# Patient Record
Sex: FEMALE | Race: WHITE
[De-identification: names, ages, dates, MRNs, and addresses within clinical notes are randomized per-mention and may not be internally consistent; named-entity substitution may affect disease eponyms.]

---

## 2019-09-13 ENCOUNTER — RESULT REVIEW (OUTPATIENT)
Age: 64
End: 2019-09-13

## 2019-11-07 PROBLEM — Z00.00 ENCOUNTER FOR PREVENTIVE HEALTH EXAMINATION: Status: ACTIVE | Noted: 2019-11-07

## 2019-12-30 ENCOUNTER — APPOINTMENT (OUTPATIENT)
Dept: UROGYNECOLOGY | Facility: CLINIC | Age: 64
End: 2019-12-30
Payer: MEDICARE

## 2019-12-30 ENCOUNTER — OUTPATIENT (OUTPATIENT)
Dept: OUTPATIENT SERVICES | Facility: HOSPITAL | Age: 64
LOS: 1 days | Discharge: HOME | End: 2019-12-30

## 2019-12-30 VITALS
BODY MASS INDEX: 27.6 KG/M2 | HEIGHT: 62 IN | SYSTOLIC BLOOD PRESSURE: 132 MMHG | DIASTOLIC BLOOD PRESSURE: 82 MMHG | WEIGHT: 150 LBS | HEART RATE: 81 BPM

## 2019-12-30 DIAGNOSIS — Z86.39 PERSONAL HISTORY OF OTHER ENDOCRINE, NUTRITIONAL AND METABOLIC DISEASE: ICD-10-CM

## 2019-12-30 DIAGNOSIS — Z78.9 OTHER SPECIFIED HEALTH STATUS: ICD-10-CM

## 2019-12-30 DIAGNOSIS — Z86.79 PERSONAL HISTORY OF OTHER DISEASES OF THE CIRCULATORY SYSTEM: ICD-10-CM

## 2019-12-30 DIAGNOSIS — Z80.9 FAMILY HISTORY OF MALIGNANT NEOPLASM, UNSPECIFIED: ICD-10-CM

## 2019-12-30 DIAGNOSIS — Z83.3 FAMILY HISTORY OF DIABETES MELLITUS: ICD-10-CM

## 2019-12-30 PROCEDURE — 99205 OFFICE O/P NEW HI 60 MIN: CPT

## 2019-12-30 PROCEDURE — 51701 INSERT BLADDER CATHETER: CPT

## 2019-12-30 RX ORDER — LISINOPRIL 10 MG/1
10 TABLET ORAL
Refills: 0 | Status: ACTIVE | COMMUNITY

## 2019-12-30 RX ORDER — LEVOTHYROXINE AND LIOTHYRONINE 38; 9 UG/1; UG/1
60 TABLET ORAL
Refills: 0 | Status: ACTIVE | COMMUNITY

## 2019-12-30 RX ORDER — METOPROLOL TARTRATE 75 MG/1
TABLET, FILM COATED ORAL
Refills: 0 | Status: ACTIVE | COMMUNITY

## 2019-12-31 DIAGNOSIS — R35.0 FREQUENCY OF MICTURITION: ICD-10-CM

## 2020-01-01 LAB — BACTERIA UR CULT: NORMAL

## 2020-01-01 NOTE — HISTORY OF PRESENT ILLNESS
[FreeTextEntry1] : \par Pt with pelvic floor dysfunction here for urogynecologic evaluation. She describes: \par Referring provider: Dr Gupta\par PCP: Dr Darren Adhikari\par \par Chief PFD: urinary frequency since pessary insertion\par \par 4/26/19 renal ultrasound: mild fullness of the left renal collecting system, unchanged from 9/2017, and a 3.2 mm echogenic nodule within the left renal cortex\par 10/19 CT: echogenic nodule of the left kidney, does not mention fullness or hydro \par \par Pelvic organ prolapse: +bulge, had Gellhorn placed 2.5 months ago, no splinting prior to gellhorn placement\par Stress urinary incontinence: min\par Overactive bladder syndrome: urinary frequency and urgency, no incontinence, no eneuresis, since pessary placement, no prior treatment, no glaucoma\par Voiding dysfunction: denies incomplete bladder emptying, denies hesitancy \par Lower urinary tract/vaginal symptoms: no recurrent UTIs per year, no hematuria, no dysuria, no bladder pain \par Sexual dysfunction: not active since pessary inserted, no pain or urinary incontinence with intercourse\par Pelvic pain: denies\par Vaginal dryness: denies \par \par Her pelvic floor symptoms are significantly bothersome and negatively impacting her quality of life. \par \par

## 2020-01-01 NOTE — COUNSELING
[FreeTextEntry1] : \par We will notify you of the urine results if they are abnormal\par \par Please increase your water intake to at least 4 cups of plain water per day\par \par For 4-5 days, cut one item from the list out of your diet.\par \par After 4-5 days, it it makes a difference, you have to decide if it is worth keeping it out of your diet to help with the urinary issues.\par \par If it does not make a difference, you should add it back into your diet and remove another item for another 4-5 days.\par \par Schedule pessary fitting with my PA, Carey\par \par Happy New Years!

## 2020-01-01 NOTE — DISCUSSION/SUMMARY
[FreeTextEntry1] : \par Uterovaginal prolapse incomplete-\par The patient was counseled regarding the possible natural progression of prolapse and the clinical consequences of worsening prolapse. The stage and the location of the prolapse was reviewed with the patient. She was counseled regarding the management strategies including observation, pelvic floor physical therapy, pessary placement and surgery (robotic hysterectomy/BS0/sacrocolpopexy). The patient voiced understanding and agrees with a pessary fitting. She wants to be sexually active.\par \par Gellhorn long stem 3/4 inch removed, cleaned, inspected and reinserted. The patient tolerated this well. \par No bleeding, lesions, abnormal discharge were noted. \par \par Urinary frequency-\par The pathophysiology of the above condition was discussed with the patient. The patient was given information and education on pelvic floor muscle exercises/rehabilitation, avoidance of dietary bladder irritants, and other strategies to improve bladder control, such as scheduled voiding. She was counseled regarding further management strategies for overactive bladder and urge incontinence including pelvic floor physical therapy, medications or surgical management. The patient voiced understanding and agrees with diet changes. We will follow up on her urine tests.\par \par \par \par

## 2020-02-06 ENCOUNTER — APPOINTMENT (OUTPATIENT)
Dept: UROGYNECOLOGY | Facility: CLINIC | Age: 65
End: 2020-02-06
Payer: MEDICARE

## 2020-02-06 VITALS
SYSTOLIC BLOOD PRESSURE: 128 MMHG | WEIGHT: 148 LBS | DIASTOLIC BLOOD PRESSURE: 78 MMHG | BODY MASS INDEX: 27.23 KG/M2 | HEART RATE: 87 BPM | HEIGHT: 62 IN

## 2020-02-06 PROCEDURE — 57160 INSERT PESSARY/OTHER DEVICE: CPT

## 2020-02-06 NOTE — COUNSELING
[FreeTextEntry1] : \par \par Jessie or Ted will contact you when the pessary arrive in the office. She will then schedule an appointment for pessary placement. (Ring and support with knob #7)\par \par Please call the office with any questions, issues, or concerns.

## 2020-02-06 NOTE — DISCUSSION/SUMMARY
[FreeTextEntry1] : \par Incomplete Uterovaginal Prolapse:\par Gellhorn long stem 2 3/4 removed, cleaned, and inspected. \par Ring and Support #5 placed without difficulty. +cough stress test. Removed without difficulty. \par Ring and Support with knob #6 placed without difficulty. +cough stress test. Removed without difficulty.\par Ring and Support with knob #7 placed without difficulty. +cough stress test. Remained in place with Valsalva, coughing, and ambulation.\par Gellhorn long stem 2 3/4  reinserted without difficulty. \par The patient tolerated all fittings well.\par Will return for placement when pessary is ordered and arrives to the office.

## 2020-02-06 NOTE — HISTORY OF PRESENT ILLNESS
[FreeTextEntry1] : \par Patient is here for pessary fitting incomplete uterovaginal prolapse. GH: 5  TVL: 10\par Last seen 12/30/19 as NP for incomplete uterovaginal prolapse, and urinary frequency\par \par 4/26/19 renal ultrasound: mild fullness of the left renal collecting system, unchanged from 9/2017, and a 3.2 mm echogenic nodule within the left renal cortex\par 10/19 CT: echogenic nodule of the left kidney, does not mention fullness or hydro \par \par Gellhorn long stem 2 3/4 in place.\par \par Minimal stress incontinence. Wishes to be sexually active.\par \par No complaints today.

## 2020-05-11 ENCOUNTER — APPOINTMENT (OUTPATIENT)
Dept: UROGYNECOLOGY | Facility: CLINIC | Age: 65
End: 2020-05-11
Payer: MEDICARE

## 2020-05-11 VITALS
HEART RATE: 86 BPM | HEIGHT: 62 IN | WEIGHT: 148 LBS | TEMPERATURE: 97.4 F | BODY MASS INDEX: 27.23 KG/M2 | SYSTOLIC BLOOD PRESSURE: 124 MMHG | DIASTOLIC BLOOD PRESSURE: 82 MMHG

## 2020-05-11 PROCEDURE — 99213 OFFICE O/P EST LOW 20 MIN: CPT

## 2020-05-11 NOTE — PHYSICAL EXAM
[Chaperone Present] : A chaperone was present in the examining room during all aspects of the physical examination [No Acute Distress] : in no acute distress [Well developed] : well developed [Well Nourished] : ~L well nourished

## 2020-05-11 NOTE — COUNSELING
[FreeTextEntry1] : \par Please call the office if you have any issues with vaginal pain, vaginal bleeding, difficulty urinating or having a bowel movement or if the pessary falls out so that we can arrange another size pessary.\par \par Please follow up for pessary maintenance and self teaching in 2 weeks with JEREMIAH Patino

## 2020-05-11 NOTE — HISTORY OF PRESENT ILLNESS
[FreeTextEntry1] : \par Patient is here for pessary placement for incomplete uterovaginal prolapse\par Last seen 2/6/2020 for pessary fitting. Ring and Support #7\par \par 4/26/19 renal ultrasound: mild fullness of the left renal collecting system, unchanged from 9/2017, and a 3.2 mm echogenic nodule within the left renal cortex\par 10/19 CT: echogenic nodule of the left kidney, does not mention fullness or hydro \par \par Gellhorn long stem 2 3/4 in place.\par \par Minimal stress incontinence. Wishes to be sexually active.\par \par Fitted for: Ring and Support #5 (+cough stress test)\par                  Ring and Support with knob # 6 (+ cough stress test)\par \par Patient is doing well and has no complaints today.

## 2020-05-11 NOTE — DISCUSSION/SUMMARY
[FreeTextEntry1] : \par Incomplete Uterovaginal Prolapse:\par Gellhorn long stem 2 3/4 removed, cleaned, inspected and NOT reinserted. The patient tolerated this well. Pessary handed back to her.\par No bleeding, lesions, abnormal discharge were noted. \par Ring and Support with knob #7 was inserted without difficulty. Pessary remained in place with Valsalva, coughing, and ambulating.\par The patient will follow up in 2 weeks for routine pessary management and self teaching.

## 2020-05-28 ENCOUNTER — APPOINTMENT (OUTPATIENT)
Dept: UROGYNECOLOGY | Facility: CLINIC | Age: 65
End: 2020-05-28
Payer: MEDICARE

## 2020-05-28 VITALS
HEART RATE: 84 BPM | HEIGHT: 62 IN | WEIGHT: 148 LBS | BODY MASS INDEX: 27.23 KG/M2 | DIASTOLIC BLOOD PRESSURE: 78 MMHG | SYSTOLIC BLOOD PRESSURE: 131 MMHG

## 2020-05-28 PROCEDURE — 99213 OFFICE O/P EST LOW 20 MIN: CPT

## 2020-05-28 NOTE — DISCUSSION/SUMMARY
[FreeTextEntry1] : \par Incomplete Uterovaginal Prolapse:\par Ring and Support #7 removed, cleaned, inspected and reinserted. The patient tolerated this well. \par No bleeding, lesions, abnormal discharge were noted. \par Patient demonstrated proper removal and re-insertion of pessary without difficulty.\par The patient will follow up in 6 months for routine pessary management.

## 2020-05-28 NOTE — COUNSELING
[FreeTextEntry1] : \par Please call the office if you have any issues with vaginal pain, vaginal bleeding, difficulty urinating or having a bowel movement or if the pessary falls out so that we can arrange another size pessary.\par \par Please follow up for pessary maintenance in 6 months with JEREMIAH Patino

## 2020-05-28 NOTE — HISTORY OF PRESENT ILLNESS
[FreeTextEntry1] : \par Patient is here for routine pessary cleaning for incomplete uterovaginal prolapse and self teaching.\par Last seen 5/11/2020 for pessary fitting. Ring and Support with knob #7\par \par 4/26/19 renal ultrasound: mild fullness of the left renal collecting system, unchanged from 9/2017, and a 3.2 mm echogenic nodule within the left renal cortex\par 10/19 CT: echogenic nodule of the left kidney, does not mention fullness or hydro \par \par s/p Gellhorn long stem 2 3/4 (wished to become sexually active)\par \par Fitted for: Ring and Support #5 (+cough stress test)\par                 Ring and Support with knob #6 (+ cough stress test)\par \par Today, patient is doing well and has no complaints.

## 2021-01-11 ENCOUNTER — APPOINTMENT (OUTPATIENT)
Dept: UROGYNECOLOGY | Facility: CLINIC | Age: 66
End: 2021-01-11
Payer: MEDICARE

## 2021-01-11 VITALS
HEIGHT: 62 IN | WEIGHT: 148 LBS | SYSTOLIC BLOOD PRESSURE: 117 MMHG | BODY MASS INDEX: 27.23 KG/M2 | HEART RATE: 71 BPM | DIASTOLIC BLOOD PRESSURE: 68 MMHG

## 2021-01-11 PROCEDURE — 99213 OFFICE O/P EST LOW 20 MIN: CPT

## 2021-01-11 PROCEDURE — 99072 ADDL SUPL MATRL&STAF TM PHE: CPT

## 2021-01-11 NOTE — HISTORY OF PRESENT ILLNESS
[FreeTextEntry1] : Patient is here for routine pessary cleaning for incomplete uterovaginal prolapse.\par Last seen 5/28/20 for pessary cleaning. Ring and support with knob #7\par \par Pt self maintains pessary q 3-4 weeks.\par \par 4/26/19 renal ultrasound: mild fullness of the left renal collecting system, unchanged from 9/2017, and a 3.2 mm echogenic nodule within the left renal cortex\par 10/19 CT: echogenic nodule of the left kidney, does not mention fullness or hydro \par \par s/p Gellhorn long stem 2 3/4 (wished to become sexually active)\par \par Fitted for: \par Ring and Support #5 (+cough stress test)\par Ring and Support with knob #6 (+ cough stress test)\par \par Today, patient is doing well and has no complaints. States that she still has leakage of urine with coughing and sneezing. Does not feel like she has a urine infection. Does her urine with her PCP, negative cultures. \par

## 2021-01-11 NOTE — COUNSELING
[FreeTextEntry1] : Please call the office if you have any issues with vaginal pain, vaginal bleeding, difficulty urinating or having a bowel movement or if the pessary falls out so that we can arrange another size pessary.\par \par Please follow up for pessary maintenance in 12 months with JEREMIAH Hutchinson\par

## 2021-01-11 NOTE — DISCUSSION/SUMMARY
[FreeTextEntry1] : Incomplete Uterovaginal Prolapse:\par Ring and support with knob #7removed, cleaned, inspected and reinserted. The patient tolerated this well. \par No bleeding, lesions, abnormal discharge were noted. \par Advised to make sure she turns the knob to be  under urethra for RU. \par The patient will follow up in 12 months for pessary check and management.\par \par

## 2022-01-10 ENCOUNTER — APPOINTMENT (OUTPATIENT)
Dept: UROGYNECOLOGY | Facility: CLINIC | Age: 67
End: 2022-01-10

## 2022-06-03 ENCOUNTER — APPOINTMENT (OUTPATIENT)
Dept: UROGYNECOLOGY | Facility: CLINIC | Age: 67
End: 2022-06-03
Payer: MEDICARE

## 2022-06-03 ENCOUNTER — LABORATORY RESULT (OUTPATIENT)
Age: 67
End: 2022-06-03

## 2022-06-03 VITALS
BODY MASS INDEX: 27.97 KG/M2 | HEART RATE: 76 BPM | SYSTOLIC BLOOD PRESSURE: 142 MMHG | WEIGHT: 152 LBS | DIASTOLIC BLOOD PRESSURE: 80 MMHG | HEIGHT: 62 IN

## 2022-06-03 DIAGNOSIS — N95.0 POSTMENOPAUSAL BLEEDING: ICD-10-CM

## 2022-06-03 PROCEDURE — A4562: CPT

## 2022-06-03 PROCEDURE — 99214 OFFICE O/P EST MOD 30 MIN: CPT | Mod: 25

## 2022-06-03 PROCEDURE — 57160 INSERT PESSARY/OTHER DEVICE: CPT

## 2022-06-03 RX ORDER — CLOTRIMAZOLE AND BETAMETHASONE DIPROPIONATE 10; .5 MG/G; MG/G
1-0.05 CREAM TOPICAL
Qty: 1 | Refills: 0 | Status: ACTIVE | COMMUNITY
Start: 2022-06-03 | End: 1900-01-01

## 2022-06-03 NOTE — HISTORY OF PRESENT ILLNESS
[FreeTextEntry1] : \par Patient is here for routine pessary cleaning for incomplete uterovaginal prolapse.\par Last seen 1/11/2021 for pessary cleaning. Ring and support with knob #7\par \par Patient self maintains pessary q 2-3 weeks.\par \par 4/26/19 renal ultrasound: mild fullness of the left renal collecting system, unchanged from 9/2017, and a 3.2 mm echogenic nodule within the left renal cortex\par 10/19 CT: echogenic nodule of the left kidney, does not mention fullness or hydro \par \par s/p Gellhorn long stem 2 3/4 (wished to become sexually active)\par \par Fitted for: \par Ring and Support #5 (+cough stress test)\par Ring and Support with knob #6 (+ cough stress test)\par \par Today, patient is doing well. She notes that she has been having brown discharge from the vagina, and pink when wiping once. She is also complaining of leakage of urine. Does not feel the bulge.  Has some burning on the labia. \par

## 2022-06-03 NOTE — PHYSICAL EXAM
[Chaperone Present] : A chaperone was present in the examining room during all aspects of the physical examination [No Acute Distress] : in no acute distress [Well developed] : well developed [Well Nourished] : ~L well nourished [FreeTextEntry1] : Urethra was prepped in sterile fashion and then a sterile catheter (14F) was used by me to drain the bladder. The patient tolerated the procedure well.\par Cath: 20cc

## 2022-06-03 NOTE — DISCUSSION/SUMMARY
[FreeTextEntry1] : \par \par Incomplete Uterovaginal Prolapse:\par Ring and support with knob #7 removed, cleaned, inspected and NOT reinserted. Knob was turned to the side on exam before removal. prolapse noted with pessary in place The patient tolerated this well. Pessary given to the patient. \par Bleeding noted. Hemostasis obtained with monsels\par Dish with support with knob #6 placed without difficulty. Remained in place with Valsalva, coughing, and ambulating. Knob under the urethra, prolapse noted with pessary in place\par The patient tolerated all fittings well. Patient left with new dish with support with knob #6 in place. \par Will return for routine pessary management in 2-3 weeks or earlier if she has any issues\par \par Post Menopausal Bleeding\par Advised further workup with a pelvic ultrasound to make sure the uterine lining is thin. Advised the patient that without a workup there is always a risk of uterine cancer or precancer that is not being diagnosed. The patient voiced understanding and agrees to the workup.\par Urine culture obtained.\par Will follow up.\par Will treat accordingly if necessary\par \par Vulvar Irritation\par Rx Lotrisone cream twice a day for 7 days\par \par

## 2022-06-03 NOTE — COUNSELING
[FreeTextEntry1] : \par Please call the office if you have any issues with vaginal pain, vaginal bleeding, difficulty urinating or having a bowel movement or if the pessary falls out so that we can arrange another size pessary.\par \par We will contact you if the urine results are abnormal.\par \par Please schedule a pelvic ultrasound ([address] [place] [address], lower level [phone], or [phone]) to make sure that the lining of the uterus is thin\par If the sonogram is negative, then we can discuss starting estrogen cream\par \par Please start using Lotrisone cream to the labia, twice a day for 7 days\par \par Please follow up for pessary maintenance in 2-3 weeks with JEREMIAH Hutchinson or JEREMIAH Dexter\par

## 2022-06-05 LAB
APPEARANCE: CLEAR
BACTERIA UR CULT: NORMAL
BILIRUBIN URINE: NEGATIVE
BLOOD URINE: ABNORMAL
COLOR: NORMAL
GLUCOSE QUALITATIVE U: NEGATIVE
KETONES URINE: NEGATIVE
LEUKOCYTE ESTERASE URINE: NEGATIVE
NITRITE URINE: NEGATIVE
PH URINE: 6.5
PROTEIN URINE: NEGATIVE
SPECIFIC GRAVITY URINE: 1.02
UROBILINOGEN URINE: NORMAL

## 2022-06-07 ENCOUNTER — RESULT REVIEW (OUTPATIENT)
Age: 67
End: 2022-06-07

## 2022-06-07 ENCOUNTER — OUTPATIENT (OUTPATIENT)
Dept: OUTPATIENT SERVICES | Facility: HOSPITAL | Age: 67
LOS: 1 days | Discharge: HOME | End: 2022-06-07
Payer: MEDICARE

## 2022-06-07 DIAGNOSIS — N95.0 POSTMENOPAUSAL BLEEDING: ICD-10-CM

## 2022-06-07 PROCEDURE — 76830 TRANSVAGINAL US NON-OB: CPT | Mod: 26

## 2022-06-16 ENCOUNTER — NON-APPOINTMENT (OUTPATIENT)
Age: 67
End: 2022-06-16

## 2022-06-23 ENCOUNTER — APPOINTMENT (OUTPATIENT)
Dept: UROGYNECOLOGY | Facility: CLINIC | Age: 67
End: 2022-06-23
Payer: MEDICARE

## 2022-06-23 VITALS
SYSTOLIC BLOOD PRESSURE: 131 MMHG | WEIGHT: 152 LBS | DIASTOLIC BLOOD PRESSURE: 72 MMHG | BODY MASS INDEX: 27.97 KG/M2 | HEART RATE: 74 BPM | HEIGHT: 62 IN

## 2022-06-23 PROCEDURE — 99214 OFFICE O/P EST MOD 30 MIN: CPT

## 2022-06-23 RX ORDER — LEVOTHYROXINE SODIUM 0.07 MG/1
75 TABLET ORAL
Qty: 90 | Refills: 0 | Status: ACTIVE | COMMUNITY
Start: 2022-06-12

## 2022-06-23 RX ORDER — ATORVASTATIN CALCIUM 20 MG/1
20 TABLET, FILM COATED ORAL
Qty: 90 | Refills: 0 | Status: ACTIVE | COMMUNITY
Start: 2022-06-06

## 2022-06-23 RX ORDER — HYDROCHLOROTHIAZIDE 12.5 MG/1
12.5 TABLET ORAL
Qty: 90 | Refills: 0 | Status: ACTIVE | COMMUNITY
Start: 2022-04-12

## 2022-06-23 NOTE — HISTORY OF PRESENT ILLNESS
[FreeTextEntry1] : Patient is here for routine pessary cleaning for incomplete uterovaginal prolapse.\par Last seen 6/3/2022 for pessary cleaning. Dish with support with knob # 6\par \par Patient self maintains pessary q 2-3 weeks.\par \par 4/26/19 renal ultrasound: mild fullness of the left renal collecting system, unchanged from 9/2017, and a 3.2 mm echogenic nodule within the left renal cortex\par 10/19 CT: echogenic nodule of the left kidney, does not mention fullness or hydro \par \par s/p Gellhorn long stem 2 3/4 (wished to become sexually active)\par \par Fitted for: \par Ring and Support #5 (+cough stress test)\par Ring and Support with knob #6 (+ cough stress test)\par s/p Ring and support with knob #7, patient was irritated by the pessary and still leaking with coughing, and sneezing\par \par 6/7/2022 TVUS- 4mm lining, normal \par \par Today, patient is doing well and has no complaints. She is happy with the new pessary, denies any bleeding. She notes that this new pessary helps her urinate better and less often and that she does not leak with coughing with this new pessary. She notes that the pessary slides a bit but she just pushes it back in. She feels that this new pessary has changed her life. \par \par \par

## 2022-06-23 NOTE — COUNSELING
[FreeTextEntry1] : \par \par Please call the office if you have any issues with vaginal pain, vaginal bleeding, difficulty urinating or having a bowel movement or if the pessary falls out so that we can arrange another size pessary.\par \par Please follow up for pessary maintenance in 6 months with JEREMIAH Hutchinson or JEREMIAH Dexter\par \par

## 2022-06-23 NOTE — DISCUSSION/SUMMARY
[FreeTextEntry1] : \par Incomplete Uterovaginal Prolapse:\par Dish with support with knob #6 removed, cleaned, inspected and reinserted. The patient tolerated this well. \par No bleeding, lesions, abnormal discharge were noted. \par Patient self maintains her pessary and has successfully inserted and removed this pessary. \par The patient will follow up in 6 months for routine pessary management.\par \par Risks and benefits of local estrogen cream reviewed with patient. Handout given to the patient. She will think about it and call the office if she decides that she would like to start it. \par

## 2022-10-26 ENCOUNTER — APPOINTMENT (OUTPATIENT)
Dept: UROGYNECOLOGY | Facility: CLINIC | Age: 67
End: 2022-10-26

## 2022-10-26 ENCOUNTER — NON-APPOINTMENT (OUTPATIENT)
Age: 67
End: 2022-10-26

## 2022-10-26 VITALS
HEIGHT: 62 IN | HEART RATE: 83 BPM | WEIGHT: 152 LBS | BODY MASS INDEX: 27.97 KG/M2 | SYSTOLIC BLOOD PRESSURE: 131 MMHG | DIASTOLIC BLOOD PRESSURE: 84 MMHG

## 2022-10-26 PROCEDURE — A4562: CPT

## 2022-10-26 PROCEDURE — 57160 INSERT PESSARY/OTHER DEVICE: CPT

## 2022-10-26 PROCEDURE — 99213 OFFICE O/P EST LOW 20 MIN: CPT | Mod: 25

## 2022-10-26 NOTE — COUNSELING
[FreeTextEntry1] : \par \par Please call the office if you have any issues with vaginal pain, vaginal bleeding, difficulty urinating or having a bowel movement or if the pessary falls out so that we can arrange another size pessary.\par \par Please follow up for pessary check in 2-3 weeks with JEREMIAH Hutchinson or JEREMIAH Dexter\par \par Call with any questions

## 2022-10-26 NOTE — HISTORY OF PRESENT ILLNESS
[FreeTextEntry1] : \par Patient is here for pessary check due to bleeding with pessary in place.\par Last seen 6/23/2022 for pessary cleaning. Dish with support with knob # 6\par \par Patient self maintains pessary q 2-3 weeks.\par \par 4/26/19 renal ultrasound: mild fullness of the left renal collecting system, unchanged from 9/2017, and a 3.2 mm echogenic nodule within the left renal cortex\par 10/19 CT: echogenic nodule of the left kidney, does not mention fullness or hydro \par \par s/p Gellhorn long stem 2 3/4 (wished to become sexually active)\par \par Fitted for: \par Ring and Support #5 (+cough stress test)\par Ring and Support with knob #6 (+ cough stress test)\par s/p Ring and support with knob #7, patient was irritated by the pessary and still leaking with coughing, and sneezing\par \par 6/7/2022 TVUS- 4mm lining, normal \par \par Today, patient is doing well. Patient was self maintaining the pessary this morning and noticed bleeding after she removed the pessary. She showered and also noticed it when wiping. She has since inserted the pessary. \par \par She notes that sometimes she feels that the pessary moves down but does not come out. When the pessary was placed, we did not have dish with support with knob #7 in the office. \par

## 2022-10-26 NOTE — DISCUSSION/SUMMARY
[FreeTextEntry1] : \par Incomplete Uterovaginal Prolapse:\par Dish with support with knob #6 removed, cleaned, inspected and reinserted. The patient tolerated this well. \par Bleeding noted. Hemostasis obtained with monsels\par \par [facility] with support with knob # 7  placed without difficulty. Remained in place with Valsalva, coughing, and ambulating. \par + prolapse in front of pessary, comfortable for the patient (pessary does not come in larger size and patient likes this kind of pessary)\par The patient tolerated all fittings well.\par Patient left with the new pessary in place\par Will return for follow up pessary check in 2-3 weeks or earlier if she has any issues\par \par Patient is still thinking about estrace cream, does not want prescription at this time. \par

## 2022-11-16 ENCOUNTER — APPOINTMENT (OUTPATIENT)
Dept: UROGYNECOLOGY | Facility: CLINIC | Age: 67
End: 2022-11-16

## 2022-11-16 VITALS
DIASTOLIC BLOOD PRESSURE: 74 MMHG | HEIGHT: 62 IN | HEART RATE: 86 BPM | BODY MASS INDEX: 27.97 KG/M2 | SYSTOLIC BLOOD PRESSURE: 115 MMHG | WEIGHT: 152 LBS

## 2022-11-16 PROCEDURE — 99214 OFFICE O/P EST MOD 30 MIN: CPT

## 2022-11-17 NOTE — DISCUSSION/SUMMARY
[FreeTextEntry1] : \par Incomplete Uterovaginal Prolapse:\par Dish with support with knob #7  removed, cleaned, inspected and reinserted. The patient tolerated this well. \par Bleeding noted. Hemostasis obtained with monsels\par Advised the patient to continue self maintenance and to schedule UDS and surgical counseling if she is interested in surgical management. \par \par \par \par

## 2022-11-17 NOTE — HISTORY OF PRESENT ILLNESS
[FreeTextEntry1] : Patient is here for routine pessary cleaning for incomplete uterovaginal prolapse.\par Last seen 10/26/2022 for pessary cleaning. Dish with support with knob # 7\par \par \par Patient self maintains pessary q 2-3 weeks.\par \par 4/26/19 renal ultrasound: mild fullness of the left renal collecting system, unchanged from 9/2017, and a 3.2 mm echogenic nodule within the left renal cortex\par 10/19 CT: echogenic nodule of the left kidney, does not mention fullness or hydro \par \par s/p Gellhorn long stem 2 3/4 (wished to become sexually active)\par \par Fitted for: \par Ring and Support #5 (+cough stress test)\par Ring and Support with knob #6 (+ cough stress test)\par s/p Ring and support with knob #7, patient was irritated by the pessary and still leaking with coughing, and sneezing\par \par 6/7/2022 TVUS- 4mm lining, normal \par \par Today, patient is doing well and has no complaints. Happy with the pessary, though she notes it does move around inside and does not fit perfectly but is easier to take in and out than her old pessary. She does not want to try another pessary. She is considering prolapse surgery, but only after the holidays. \par \par \par

## 2022-11-17 NOTE — COUNSELING
[FreeTextEntry1] : \par Please call the office if you have any issues with vaginal pain, vaginal bleeding, difficulty urinating or having a bowel movement or if the pessary falls out so that we can arrange another size pessary.\par \par Schedule bladder function testing (UDS with reduction) with JEREMIAH Dexter\par \par Please call the office if you feel like you have an infection because we cannot do the bladder function testing in the setting of an infection.\par \par Please come with a full, not painful bladder.\par \par Please schedule surgical counseling visit with Dr. Moore

## 2022-12-15 ENCOUNTER — APPOINTMENT (OUTPATIENT)
Dept: UROGYNECOLOGY | Facility: CLINIC | Age: 67
End: 2022-12-15

## 2023-01-17 ENCOUNTER — APPOINTMENT (OUTPATIENT)
Dept: UROGYNECOLOGY | Facility: CLINIC | Age: 68
End: 2023-01-17
Payer: MEDICARE

## 2023-01-17 VITALS
SYSTOLIC BLOOD PRESSURE: 128 MMHG | DIASTOLIC BLOOD PRESSURE: 77 MMHG | HEART RATE: 81 BPM | BODY MASS INDEX: 28.52 KG/M2 | HEIGHT: 62 IN | WEIGHT: 155 LBS

## 2023-01-17 LAB
BILIRUB UR QL STRIP: NEGATIVE
CLARITY UR: CLEAR
COLLECTION METHOD: NORMAL
GLUCOSE UR-MCNC: NEGATIVE
HCG UR QL: 0.2 EU/DL
HGB UR QL STRIP.AUTO: NORMAL
KETONES UR-MCNC: NEGATIVE
LEUKOCYTE ESTERASE UR QL STRIP: NEGATIVE
NITRITE UR QL STRIP: NEGATIVE
PH UR STRIP: 6
PROT UR STRIP-MCNC: NEGATIVE
SP GR UR STRIP: 1.02

## 2023-01-17 PROCEDURE — 51784 ANAL/URINARY MUSCLE STUDY: CPT

## 2023-01-17 PROCEDURE — 51741 ELECTRO-UROFLOWMETRY FIRST: CPT

## 2023-01-17 PROCEDURE — 81003 URINALYSIS AUTO W/O SCOPE: CPT | Mod: QW

## 2023-01-17 PROCEDURE — 51729 CYSTOMETROGRAM W/VP&UP: CPT

## 2023-01-17 PROCEDURE — 51797 INTRAABDOMINAL PRESSURE TEST: CPT

## 2023-01-20 LAB — URINE CULTURE <10: NORMAL

## 2023-01-30 ENCOUNTER — APPOINTMENT (OUTPATIENT)
Dept: UROGYNECOLOGY | Facility: CLINIC | Age: 68
End: 2023-01-30
Payer: MEDICARE

## 2023-01-30 VITALS
WEIGHT: 155 LBS | HEIGHT: 62 IN | BODY MASS INDEX: 28.52 KG/M2 | HEART RATE: 83 BPM | DIASTOLIC BLOOD PRESSURE: 85 MMHG | SYSTOLIC BLOOD PRESSURE: 146 MMHG

## 2023-01-30 DIAGNOSIS — Z87.898 PERSONAL HISTORY OF OTHER SPECIFIED CONDITIONS: ICD-10-CM

## 2023-01-30 DIAGNOSIS — N90.89 OTHER SPECIFIED NONINFLAMMATORY DISORDERS OF VULVA AND PERINEUM: ICD-10-CM

## 2023-01-30 PROCEDURE — 99215 OFFICE O/P EST HI 40 MIN: CPT

## 2023-01-30 NOTE — HISTORY OF PRESENT ILLNESS
[FreeTextEntry1] : \par The patient is here for surgical counseling for uterovaginal prolapse incomplete \par Has been self maintaining pessary management (with many different types and sizes) because she does have intermittent spotting.\par \par 6/7/2022: ultrasound: uterus 7.3cm, endometrial lining 4mm, neither ovary seen\par 9/2019: pap negative\par \par 1/17/2023: urodynamics: sensitive bladder (capacity 149cc), no USUI, +obstructive voiding\par \par Candidate for a robotic hysterectomy/BS0/sacrocolpopexy\par \par Patient had stress incontinence prior to pessary management. Will check a cough stress test today with a full bladder\par

## 2023-01-30 NOTE — DISCUSSION/SUMMARY
[FreeTextEntry1] : \par Uterovaginal prolapse incomplete-\par The patient was counseled regarding the possible natural progression of prolapse and the clinical consequences of worsening prolapse. The stage and the location of the prolapse was reviewed with the patient. She was counseled regarding the management strategies including observation, pessary management and surgery. \par \par The surgical procedure of a robotic hysterectomy/BS0/sacrocolpopexy/midurethral sling was reviewed. The patient was advised that the surgery does not improve urge incontinence and can worsen those symptoms. The postoperative restrictions were reviewed. All of the patient's questions and concerns were answered. \par \par The interpretation of the urodynamics was reviewed. We discussed the urodynamic findings that show obstructive voiding (high detrusor pressures with low flow). We discussed the cause of this in women including a previous incontinence procedure (which the patient has not had) and/or prolapse (patient has symptomatic prolapse). We discussed that without lowering the detrusor pressure she is at risk for developing kidney damage. We are unable to determine if and when this will occur. Discussed that the ways to lower this pressure include prolapse reduction (with pessary or surgery). The patient voiced understanding and agrees to renal imaging and will review about surgery.\par \par The risks and benefits and alternatives of the above procedures were reviewed and the patient wants more time to review.\par \par IF SHE DOES WANT SURGICAL MANAGEMENT, THEN NEED TO DISCUSS THE RISK OF BLADDER PERFORATION WITH THE SLING.\par \par Obstructive uropathy-\par Advised renal imaging to rule out hydro in the setting of her obstructive voiding. The patient voiced understanding and agrees with the plan.

## 2023-01-30 NOTE — COUNSELING
[FreeTextEntry1] : \par Please schedule the kidney sonogram to make sure that they have not become swollen\par \par Please review the surgical handouts\par \par Please call the office if you have any issues with vaginal pain, vaginal bleeding, difficulty urinating or having a bowel movement or if the pessary falls out so that we can arrange another size pessary.\par \par Please call the office if you decide you would like to meet again for further surgical discussion and to sign consent

## 2023-01-30 NOTE — PHYSICAL EXAM
[Chaperone Present] : A chaperone was present in the examining room during all aspects of the physical examination [FreeTextEntry1] : \par GH:  6   pB: 4   TVL: 9  C: +4  D: -3.5 Aa: +3  Ba: +4  Ap: 0 Bp: 0\par  \par No abdominal incisions noted\par positive cough stress test (reduced prolapse and with a full bladder)\par positive atrophy\par positive urethral hypermobility\par bilateral levator ani spasm,  no tenderness\par no urethral tenderness\par no bladder tenderness\par no cervical tenderness\par no uterine tenderness\par \par

## 2023-02-01 LAB
CYTOLOGY CVX/VAG DOC THIN PREP: NORMAL
HPV HIGH+LOW RISK DNA PNL CVX: NOT DETECTED

## 2023-04-06 ENCOUNTER — NON-APPOINTMENT (OUTPATIENT)
Age: 68
End: 2023-04-06

## 2023-11-27 ENCOUNTER — APPOINTMENT (OUTPATIENT)
Dept: UROGYNECOLOGY | Facility: CLINIC | Age: 68
End: 2023-11-27
Payer: MEDICARE

## 2023-11-27 ENCOUNTER — LABORATORY RESULT (OUTPATIENT)
Age: 68
End: 2023-11-27

## 2023-11-27 VITALS
HEART RATE: 86 BPM | WEIGHT: 155 LBS | SYSTOLIC BLOOD PRESSURE: 134 MMHG | DIASTOLIC BLOOD PRESSURE: 85 MMHG | BODY MASS INDEX: 28.52 KG/M2 | HEIGHT: 62 IN

## 2023-11-27 DIAGNOSIS — R31.9 HEMATURIA, UNSPECIFIED: ICD-10-CM

## 2023-11-27 DIAGNOSIS — N89.8 OTHER SPECIFIED NONINFLAMMATORY DISORDERS OF VAGINA: ICD-10-CM

## 2023-11-27 DIAGNOSIS — N81.2 INCOMPLETE UTEROVAGINAL PROLAPSE: ICD-10-CM

## 2023-11-27 DIAGNOSIS — N13.9 OBSTRUCTIVE AND REFLUX UROPATHY, UNSPECIFIED: ICD-10-CM

## 2023-11-27 PROCEDURE — 99214 OFFICE O/P EST MOD 30 MIN: CPT | Mod: 25

## 2023-11-27 PROCEDURE — 51701 INSERT BLADDER CATHETER: CPT

## 2023-11-27 RX ORDER — CLOTRIMAZOLE AND BETAMETHASONE DIPROPIONATE 10; .5 MG/G; MG/G
1-0.05 CREAM TOPICAL TWICE DAILY
Qty: 1 | Refills: 1 | Status: ACTIVE | COMMUNITY
Start: 2023-11-27 | End: 1900-01-01

## 2023-11-27 RX ORDER — HYDROCHLOROTHIAZIDE 100 MG
100 TABLET ORAL
Refills: 0 | Status: COMPLETED | COMMUNITY
End: 2023-11-27

## 2023-12-05 LAB — URINE CULTURE <10: NORMAL

## 2024-08-08 ENCOUNTER — APPOINTMENT (OUTPATIENT)
Dept: UROGYNECOLOGY | Facility: CLINIC | Age: 69
End: 2024-08-08

## 2024-08-08 PROCEDURE — 99459 PELVIC EXAMINATION: CPT

## 2024-08-08 PROCEDURE — 99214 OFFICE O/P EST MOD 30 MIN: CPT

## 2024-08-08 NOTE — DISCUSSION/SUMMARY
[FreeTextEntry1] : Incomplete Uterovaginal Prolapse: Ring and support with knob # 7 removed, cleaned, inspected and NOT reinserted. The patient tolerated this well.  Ulceration and bleeding noted on the right vaginal wall. Hemostasis obtained with monsels. Advised to take a break from the pessary to allow healing.  Patient does not need the knob pessary, will re-evaluate at next visit and do another pessary fitting.  The patient will follow up in 1-2months for routine pessary management.  Post menopausal bleeding TV sonogram ordered to evaluate ES  Obstructive Uropathy (seen on UDS from 2023) Again advised and ordered renal sonogram to evaluate as advised by Dr Moore  Vaginal irritation Pessary holiday to allow healing, will re-evaluate.

## 2024-08-08 NOTE — COUNSELING
[FreeTextEntry1] : Please call the office if you have any issues with vaginal pain, vaginal bleeding, difficulty urinating or having a bowel movement.   Please schedule an appointment to see regular gynecologist for routine GYN care.   Please schedule renal sonogram to evaluate the kidneys.  Please schedule transvaginal pelvic sonogram to evaluate the lining of the uterus due to bleeding.  Please follow up for re-evaluation in 1-2 months with JEREMIAH Hutchinson.

## 2024-08-08 NOTE — HISTORY OF PRESENT ILLNESS
[FreeTextEntry1] : Patient is here for evaluation of bleeding and pessary check, cleaning for incomplete uterovaginal prolapse. Last seen 11/27/23 for pessary cleaning. Ring and support with knob # 7   Patient self maintains pessary q monthly.  4/26/19 renal ultrasound: mild fullness of the left renal collecting system, unchanged from 9/2017, and a 3.2 mm echogenic nodule within the left renal cortex 10/19 CT: echogenic nodule of the left kidney, does not mention fullness or hydro  s/p [place] long stem 2 3/4 (wished to become sexually active)  Fitted for: Ring and Support #5 (+cough stress test) Ring and Support with knob #6 (+ cough stress test) s/p Ring and support with knob #7, patient was irritated by the pessary and still leaking with coughing, and sneezing  6/7/2022 TVUS- 4mm lining, normal 9/2019: pap negative 1/17/2023: urodynamics: sensitive bladder (capacity 149cc), no USUI, +obstructive voiding. Pt advised to renal sonogram to evaluate the kidneys  Candidate for a robotic hysterectomy/BS0/sacrocolpopexy  1/30/23: GH: 6 pB: 4 TVL: 9 C: +4 D: -3.5 Aa: +3 Ba: +4 Ap: 0 Bp: 0 Sexually active Ring and support with knob # 7  Today, patient is doing well. Reports that while she was on vacation she noticed blood when wiping after a bowel movement, then before bowel movement, noticed it few times and it was bright red. She tried staying without the pessary and it improved. Now with the pessary she sees pink when wiping after urination sometimes. Denies UTIs symptoms, Reports that this happened few years ago and had pelvic sonogram and it was normal. Reports that she has not seen regular GYN in few years and has not had mammograms, scheduled for one in October.

## 2024-08-08 NOTE — PHYSICAL EXAM
[Chaperone Present] : A chaperone was present in the examining room during all aspects of the physical examination [68172] : A chaperone was present during the pelvic exam. [No Acute Distress] : in no acute distress [Well developed] : well developed [Well Nourished] : ~L well nourished [FreeTextEntry2] : [place]

## 2024-08-08 NOTE — REASON FOR VISIT
[TextEntry] : Reason for visit: Follow Up Pessary Maintenance Voids per day: 10   Voids per night: 1   Urge incontinence: No Stress incontinence: Yes Constipation: No Fecal incontinence: No Vaginal bulge: No, pessary in place

## 2024-09-05 ENCOUNTER — APPOINTMENT (OUTPATIENT)
Dept: UROGYNECOLOGY | Facility: CLINIC | Age: 69
End: 2024-09-05

## 2024-09-27 ENCOUNTER — APPOINTMENT (OUTPATIENT)
Dept: UROGYNECOLOGY | Facility: CLINIC | Age: 69
End: 2024-09-27

## 2024-09-27 ENCOUNTER — APPOINTMENT (OUTPATIENT)
Dept: UROGYNECOLOGY | Facility: CLINIC | Age: 69
End: 2024-09-27
Payer: MEDICARE

## 2024-09-27 VITALS
HEART RATE: 76 BPM | WEIGHT: 147 LBS | HEIGHT: 62 IN | DIASTOLIC BLOOD PRESSURE: 75 MMHG | BODY MASS INDEX: 27.05 KG/M2 | SYSTOLIC BLOOD PRESSURE: 126 MMHG

## 2024-09-27 DIAGNOSIS — N81.2 INCOMPLETE UTEROVAGINAL PROLAPSE: ICD-10-CM

## 2024-09-27 PROCEDURE — A4562: CPT

## 2024-09-27 PROCEDURE — 99214 OFFICE O/P EST MOD 30 MIN: CPT | Mod: 25

## 2024-09-27 PROCEDURE — 99459 PELVIC EXAMINATION: CPT

## 2024-09-27 PROCEDURE — 57160 INSERT PESSARY/OTHER DEVICE: CPT

## 2024-09-27 NOTE — PHYSICAL EXAM
[Chaperone Present] : A chaperone was present in the examining room during all aspects of the physical examination [[id number]] : A chaperone was present during the pelvic exam. [FreeTextEntry2] : [place] [No Acute Distress] : in no acute distress [Well developed] : well developed [Well Nourished] : ~L well nourished

## 2024-09-27 NOTE — REASON FOR VISIT
[TextEntry] : Reason for visit: Pessary Fitting Voids per day: 10  Voids per night: 1   Urge incontinence: No   Stress incontinence: Yes Constipation: No Fecal incontinence: No Vaginal bulge: Yes

## 2024-09-27 NOTE — DISCUSSION/SUMMARY
[FreeTextEntry1] : Incomplete Uterovaginal Prolapse: GH: 5 TVL: 8 today Ring and support # 5 placed without difficulty. Remained in place with Valsalva, coughing, and ambulating. Was comfortable but would fall out with strong valsalva Ring and support # 6 placed without difficulty. Remained in place with Valsalva, coughing, and ambulating. Fell out with valsalva [place] # 5 placed without difficulty. Remained in place with Valsalva, coughing, and ambulating. Was comfortable and stayed in place.  Fitter pessary removed.  New pessary inserted, [place] # 5 The patient tolerated all fittings well. Follow up in 3-4 weeks for pessary check. Patient is ok with coming in for pessary cleaning every 3 months as long as she is able to be sexually active with this pessary.  Discussed surgical management as well, pt will consider it.

## 2024-09-27 NOTE — PHYSICAL EXAM
[Chaperone Present] : A chaperone was present in the examining room during all aspects of the physical examination [[id number]] : A chaperone was present during the pelvic exam. [FreeTextEntry2] : [place] [No Acute Distress] : in no acute distress [Well developed] : well developed [Well Nourished] : ~L well nourished

## 2024-09-27 NOTE — DISCUSSION/SUMMARY
[FreeTextEntry1] : Incomplete Uterovaginal Prolapse: GH: 5 TVL: 8 today Ring and support # 5 placed without difficulty. Remained in place with Valsalva, coughing, and ambulating. Was comfortable but would fall out with strong valsalva Ring and support # 6 placed without difficulty. Remained in place with Valsalva, coughing, and ambulating. Fell out with valsalva [place] # 5 placed without difficulty. Remained in place with Valsalva, coughing, and ambulating. Was comfortable and stayed in place.  Fitter pessary removed.  New pessary inserted, [place] # 5 The patient tolerated all fittings well. Follow up in 3-4 weeks for pessary check. Patient is ok with coming in for pessary cleaning every 3 months as long as she is able to be sexually active with this pessary.  Discussed surgical management as well, pt will consider it.

## 2024-09-27 NOTE — END OF VISIT
[TextEntry] :  IEvangelina PA-C, spent 30 minutes face to face with the patient. This excludes fitting.

## 2024-09-27 NOTE — HISTORY OF PRESENT ILLNESS
[FreeTextEntry1] : Patient is here for pessary fitting.  Last seen 8/8/24 for pessary cleaning for incomplete uterovaginal prolapse.    Patient self maintains pessary q monthly.  4/26/19 renal ultrasound: mild fullness of the left renal collecting system, unchanged from 9/2017, and a 3.2 mm echogenic nodule within the left renal cortex 10/19 CT: echogenic nodule of the left kidney, does not mention fullness or hydro  s/p [place]ell[place] long stem 2 3/4 (wished to become sexually active)  Fitted for: Ring and Support #5 (+cough stress test) Ring and Support with knob #6 (+ cough stress test) s/p Ring and support with knob #7, patient was irritated by the pessary and still leaking with coughing, and sneezing  6/7/2022 TVUS- 4mm lining, normal 9/2019: pap negative 1/17/2023: urodynamics: sensitive bladder (capacity 149cc), no USUI, +obstructive voiding. Pt advised to renal sonogram to evaluate the kidneys  Candidate for a robotic hysterectomy/BS0/sacrocolpopexy  1/30/23: GH: 6 pB: 4 TVL: 9 C: +4 D: -3.5 Aa: +3 Ba: +4 Ap: 0 Bp: 0 Sexually active Ring and support with knob # 7: caused irritation and bleeding.   8/8/24, ring and support with knob # 7 removed and not reinserted due to bleeding. Pt does not need the knob.   Today patient states that she is bothered by the prolapse and would like to try pessary fitting. Denies bleeding or pain. Denies any urinary leakage with cough or otherwise. Sexually active, would like a pessary that she can either remove and self maintain or be sexually active with. Not interested in surgery at this time.

## 2024-09-27 NOTE — COUNSELING
[FreeTextEntry1] : Please call the office if you have any issues with vaginal pain, vaginal bleeding, difficulty urinating or having a bowel movement or if the pessary falls out so that we can arrange another size pessary.  Please follow up for pessary maintenance in 3-4 weeks with JEREMIAH Hutchinson

## 2024-10-29 ENCOUNTER — APPOINTMENT (OUTPATIENT)
Dept: UROGYNECOLOGY | Facility: CLINIC | Age: 69
End: 2024-10-29
Payer: MEDICARE

## 2024-10-29 VITALS
HEIGHT: 62 IN | DIASTOLIC BLOOD PRESSURE: 66 MMHG | WEIGHT: 147 LBS | BODY MASS INDEX: 27.05 KG/M2 | SYSTOLIC BLOOD PRESSURE: 109 MMHG | HEART RATE: 71 BPM

## 2024-10-29 DIAGNOSIS — N81.2 INCOMPLETE UTEROVAGINAL PROLAPSE: ICD-10-CM

## 2024-10-29 DIAGNOSIS — N89.8 OTHER SPECIFIED NONINFLAMMATORY DISORDERS OF VAGINA: ICD-10-CM

## 2024-10-29 DIAGNOSIS — Z87.448 PERSONAL HISTORY OF OTHER DISEASES OF URINARY SYSTEM: ICD-10-CM

## 2024-10-29 DIAGNOSIS — Z87.42 PERSONAL HISTORY OF OTHER DISEASES OF THE FEMALE GENITAL TRACT: ICD-10-CM

## 2024-10-29 PROCEDURE — 99459 PELVIC EXAMINATION: CPT

## 2024-10-29 PROCEDURE — 99214 OFFICE O/P EST MOD 30 MIN: CPT

## 2024-10-29 PROCEDURE — G2211 COMPLEX E/M VISIT ADD ON: CPT

## 2024-11-25 ENCOUNTER — APPOINTMENT (OUTPATIENT)
Dept: UROGYNECOLOGY | Facility: CLINIC | Age: 69
End: 2024-11-25

## 2025-01-17 ENCOUNTER — APPOINTMENT (OUTPATIENT)
Dept: UROGYNECOLOGY | Facility: CLINIC | Age: 70
End: 2025-01-17
Payer: MEDICARE

## 2025-01-17 VITALS
SYSTOLIC BLOOD PRESSURE: 117 MMHG | WEIGHT: 146 LBS | BODY MASS INDEX: 26.87 KG/M2 | HEIGHT: 62 IN | DIASTOLIC BLOOD PRESSURE: 77 MMHG | HEART RATE: 73 BPM

## 2025-01-17 DIAGNOSIS — N81.2 INCOMPLETE UTEROVAGINAL PROLAPSE: ICD-10-CM

## 2025-01-17 PROCEDURE — 99214 OFFICE O/P EST MOD 30 MIN: CPT

## 2025-01-17 PROCEDURE — G2211 COMPLEX E/M VISIT ADD ON: CPT

## 2025-01-17 PROCEDURE — 99459 PELVIC EXAMINATION: CPT

## 2025-04-21 ENCOUNTER — APPOINTMENT (OUTPATIENT)
Dept: UROGYNECOLOGY | Facility: CLINIC | Age: 70
End: 2025-04-21
Payer: MEDICARE

## 2025-04-21 ENCOUNTER — NON-APPOINTMENT (OUTPATIENT)
Age: 70
End: 2025-04-21

## 2025-04-21 VITALS
WEIGHT: 146 LBS | HEIGHT: 62 IN | DIASTOLIC BLOOD PRESSURE: 81 MMHG | SYSTOLIC BLOOD PRESSURE: 135 MMHG | BODY MASS INDEX: 26.87 KG/M2 | HEART RATE: 71 BPM

## 2025-04-21 DIAGNOSIS — Z87.42 PERSONAL HISTORY OF OTHER DISEASES OF THE FEMALE GENITAL TRACT: ICD-10-CM

## 2025-04-21 DIAGNOSIS — N81.2 INCOMPLETE UTEROVAGINAL PROLAPSE: ICD-10-CM

## 2025-04-21 PROCEDURE — 99459 PELVIC EXAMINATION: CPT

## 2025-04-21 PROCEDURE — 99214 OFFICE O/P EST MOD 30 MIN: CPT

## 2025-04-21 PROCEDURE — G2211 COMPLEX E/M VISIT ADD ON: CPT

## 2025-07-23 ENCOUNTER — APPOINTMENT (OUTPATIENT)
Dept: UROGYNECOLOGY | Facility: CLINIC | Age: 70
End: 2025-07-23
Payer: MEDICARE

## 2025-07-23 VITALS
HEART RATE: 86 BPM | HEIGHT: 62 IN | SYSTOLIC BLOOD PRESSURE: 125 MMHG | BODY MASS INDEX: 27.23 KG/M2 | DIASTOLIC BLOOD PRESSURE: 76 MMHG | WEIGHT: 148 LBS

## 2025-07-23 DIAGNOSIS — N81.2 INCOMPLETE UTEROVAGINAL PROLAPSE: ICD-10-CM

## 2025-07-23 PROCEDURE — 99459 PELVIC EXAMINATION: CPT

## 2025-07-23 PROCEDURE — 99214 OFFICE O/P EST MOD 30 MIN: CPT
